# Patient Record
Sex: MALE | Race: WHITE
[De-identification: names, ages, dates, MRNs, and addresses within clinical notes are randomized per-mention and may not be internally consistent; named-entity substitution may affect disease eponyms.]

---

## 2022-05-19 ENCOUNTER — HOSPITAL ENCOUNTER (OUTPATIENT)
Dept: HOSPITAL 63 - RAD | Age: 47
End: 2022-05-19
Attending: PHYSICIAN ASSISTANT
Payer: OTHER GOVERNMENT

## 2022-05-19 DIAGNOSIS — M25.461: ICD-10-CM

## 2022-05-19 DIAGNOSIS — M17.11: Primary | ICD-10-CM

## 2022-05-19 DIAGNOSIS — M25.762: ICD-10-CM

## 2022-05-19 DIAGNOSIS — M25.761: ICD-10-CM

## 2022-05-19 PROCEDURE — 73560 X-RAY EXAM OF KNEE 1 OR 2: CPT

## 2022-05-19 PROCEDURE — 73565 X-RAY EXAM OF KNEES: CPT

## 2022-05-19 NOTE — RAD
Exam: XR KNEE_AP BILAT STANDING, XR KNEE_RT 1-2 VIEWS



History: Chronic right knee pain



Comparison: None.



Findings:

Osseous mineralization is normal. No acute fracture or dislocation. Small tricompartmental osteophyte
s in the right knee. The joint spaces are otherwise relatively well-preserved. Small suprapatellar ef
fusion.



Single AP view of the contralateral left knee demonstrates small marginal osteophytes.



Impression: 

1.  Mild degenerative changes and small effusion without acute osseous abnormality in the right knee.




Electronically signed by: Jefe Dave MD (5/19/2022 10:11 AM) [id number]

## 2022-05-19 NOTE — RAD
Exam: XR KNEE_AP BILAT STANDING, XR KNEE_RT 1-2 VIEWS



History: Chronic right knee pain



Comparison: None.



Findings:

Osseous mineralization is normal. No acute fracture or dislocation. Small tricompartmental osteophyte
s in the right knee. The joint spaces are otherwise relatively well-preserved. Small suprapatellar ef
fusion.



Single AP view of the contralateral left knee demonstrates small marginal osteophytes.



Impression: 

1.  Mild degenerative changes and small effusion without acute osseous abnormality in the right knee.




Electronically signed by: Jefe Dave MD (5/19/2022 10:11 AM) [id number]